# Patient Record
Sex: FEMALE | Race: WHITE
[De-identification: names, ages, dates, MRNs, and addresses within clinical notes are randomized per-mention and may not be internally consistent; named-entity substitution may affect disease eponyms.]

---

## 2019-01-05 ENCOUNTER — HOSPITAL ENCOUNTER (INPATIENT)
Dept: HOSPITAL 91 - OBT | Age: 29
LOS: 3 days | Discharge: HOME | End: 2019-01-08
Payer: COMMERCIAL

## 2019-01-05 ENCOUNTER — HOSPITAL ENCOUNTER (INPATIENT)
Dept: HOSPITAL 10 - L-D | Age: 29
LOS: 3 days | Discharge: HOME | End: 2019-01-08
Attending: OBSTETRICS & GYNECOLOGY | Admitting: OBSTETRICS & GYNECOLOGY
Payer: COMMERCIAL

## 2019-01-05 VITALS — HEART RATE: 71 BPM | DIASTOLIC BLOOD PRESSURE: 51 MMHG | RESPIRATION RATE: 18 BRPM | SYSTOLIC BLOOD PRESSURE: 98 MMHG

## 2019-01-05 VITALS — SYSTOLIC BLOOD PRESSURE: 108 MMHG | DIASTOLIC BLOOD PRESSURE: 57 MMHG | HEART RATE: 88 BPM | RESPIRATION RATE: 20 BRPM

## 2019-01-05 VITALS
HEIGHT: 61 IN | WEIGHT: 185.19 LBS | WEIGHT: 185.19 LBS | BODY MASS INDEX: 34.96 KG/M2 | BODY MASS INDEX: 34.96 KG/M2 | HEIGHT: 61 IN

## 2019-01-05 VITALS — SYSTOLIC BLOOD PRESSURE: 98 MMHG | HEART RATE: 85 BPM | DIASTOLIC BLOOD PRESSURE: 54 MMHG | RESPIRATION RATE: 18 BRPM

## 2019-01-05 DIAGNOSIS — Z23: ICD-10-CM

## 2019-01-05 DIAGNOSIS — D62: ICD-10-CM

## 2019-01-05 DIAGNOSIS — Z3A.38: ICD-10-CM

## 2019-01-05 DIAGNOSIS — G89.18: ICD-10-CM

## 2019-01-05 DIAGNOSIS — O34.219: ICD-10-CM

## 2019-01-05 LAB
ADD MAN DIFF?: NO
BASOPHIL #: 0 10^3/UL (ref 0–0.1)
BASOPHILS %: 0.3 % (ref 0–2)
EOSINOPHILS #: 0.3 10^3/UL (ref 0–0.5)
EOSINOPHILS %: 2 % (ref 0–7)
HEMATOCRIT: 33.8 % (ref 37–47)
HEMOGLOBIN: 11.2 G/DL (ref 12–16)
HEPATITIS B SURFACE ANTIGEN: NEGATIVE
IMMATURE GRANS #M: 0.08 10^3/UL (ref 0–0.03)
IMMATURE GRANS % (M): 0.6 % (ref 0–0.43)
INR: 0.98
LYMPHOCYTES #: 2.9 10^3/UL (ref 0.8–2.9)
LYMPHOCYTES %: 22.1 % (ref 15–51)
MEAN CORPUSCULAR HEMOGLOBIN: 29.5 PG (ref 29–33)
MEAN CORPUSCULAR HGB CONC: 33.1 G/DL (ref 32–37)
MEAN CORPUSCULAR VOLUME: 88.9 FL (ref 82–101)
MEAN PLATELET VOLUME: 9.7 FL (ref 7.4–10.4)
MONOCYTE #: 0.8 10^3/UL (ref 0.3–0.9)
MONOCYTES %: 6.2 % (ref 0–11)
NEUTROPHIL #: 9.1 10^3/UL (ref 1.6–7.5)
NEUTROPHILS %: 68.8 % (ref 39–77)
NUCLEATED RED BLOOD CELLS #: 0 10^3/UL (ref 0–0)
NUCLEATED RED BLOOD CELLS%: 0 /100WBC (ref 0–0)
PARTIAL THROMBOPLASTIN TIME: 28.3 SEC (ref 23–35)
PLATELET COUNT: 272 10^3/UL (ref 140–415)
PROTIME: 13.1 SEC (ref 11.9–14.9)
PT RATIO: 1
RAPID PLASMA REAGIN: NONREACTIVE
RED BLOOD COUNT: 3.8 10^6/UL (ref 4.2–5.4)
RED CELL DISTRIBUTION WIDTH: 13.5 % (ref 11.5–14.5)
WHITE BLOOD COUNT: 13.2 10^3/UL (ref 4.8–10.8)

## 2019-01-05 PROCEDURE — 86900 BLOOD TYPING SEROLOGIC ABO: CPT

## 2019-01-05 PROCEDURE — 86592 SYPHILIS TEST NON-TREP QUAL: CPT

## 2019-01-05 PROCEDURE — 85730 THROMBOPLASTIN TIME PARTIAL: CPT

## 2019-01-05 PROCEDURE — 85610 PROTHROMBIN TIME: CPT

## 2019-01-05 PROCEDURE — G0463 HOSPITAL OUTPT CLINIC VISIT: HCPCS

## 2019-01-05 PROCEDURE — 85025 COMPLETE CBC W/AUTO DIFF WBC: CPT

## 2019-01-05 PROCEDURE — 86901 BLOOD TYPING SEROLOGIC RH(D): CPT

## 2019-01-05 PROCEDURE — 86850 RBC ANTIBODY SCREEN: CPT

## 2019-01-05 PROCEDURE — 90715 TDAP VACCINE 7 YRS/> IM: CPT

## 2019-01-05 PROCEDURE — 87340 HEPATITIS B SURFACE AG IA: CPT

## 2019-01-05 RX ADMIN — Medication 1 MLS/HR: at 13:00

## 2019-01-05 RX ADMIN — AMPICILLIN 1 MLS/HR: 2 INJECTION, POWDER, FOR SOLUTION INTRAVENOUS at 10:03

## 2019-01-05 RX ADMIN — DOCUSATE SODIUM AND SENNOSIDES 1 TAB: 8.6; 5 TABLET, FILM COATED ORAL at 21:29

## 2019-01-05 RX ADMIN — Medication SCH MLS/HR: at 14:31

## 2019-01-05 RX ADMIN — KETOROLAC TROMETHAMINE 1 MG: 30 INJECTION, SOLUTION INTRAMUSCULAR at 23:30

## 2019-01-05 RX ADMIN — PYRIDOXINE HYDROCHLORIDE 1 MLS/HR: 100 INJECTION, SOLUTION INTRAMUSCULAR; INTRAVENOUS at 10:45

## 2019-01-05 RX ADMIN — PYRIDOXINE HYDROCHLORIDE 1 MLS/HR: 100 INJECTION, SOLUTION INTRAMUSCULAR; INTRAVENOUS at 23:45

## 2019-01-05 RX ADMIN — PROMETHAZINE HYDROCHLORIDE 1 MG: 25 TABLET ORAL at 14:27

## 2019-01-05 RX ADMIN — CEFAZOLIN SODIUM 1 MLS/HR: 2 SOLUTION INTRAVENOUS at 11:33

## 2019-01-05 RX ADMIN — DOCUSATE SODIUM AND SENNOSIDES SCH TAB: 8.6; 5 TABLET, FILM COATED ORAL at 21:29

## 2019-01-05 RX ADMIN — PYRIDOXINE HYDROCHLORIDE 1 MLS/HR: 100 INJECTION, SOLUTION INTRAMUSCULAR; INTRAVENOUS at 10:03

## 2019-01-05 RX ADMIN — Medication 1 MLS/HR: at 14:31

## 2019-01-05 RX ADMIN — Medication 1 APPLIC: at 23:50

## 2019-01-05 RX ADMIN — Medication SCH MLS/HR: at 13:00

## 2019-01-05 RX ADMIN — CEFAZOLIN SODIUM 1 MLS/HR: 2 SOLUTION INTRAVENOUS at 19:49

## 2019-01-05 RX ADMIN — ONDANSETRON HYDROCHLORIDE 1 MG: 2 INJECTION, SOLUTION INTRAMUSCULAR; INTRAVENOUS at 12:56

## 2019-01-05 RX ADMIN — PYRIDOXINE HYDROCHLORIDE SCH MLS/HR: 100 INJECTION, SOLUTION INTRAMUSCULAR; INTRAVENOUS at 10:03

## 2019-01-05 RX ADMIN — PYRIDOXINE HYDROCHLORIDE SCH MLS/HR: 100 INJECTION, SOLUTION INTRAMUSCULAR; INTRAVENOUS at 10:45

## 2019-01-05 RX ADMIN — KETOROLAC TROMETHAMINE SCH MG: 30 INJECTION, SOLUTION INTRAMUSCULAR at 23:30

## 2019-01-05 NOTE — TRIAGE
===================================

OB Triage

===================================

Datetime Report Generated by CPN: 01/05/2019 09:53

   

   

===========================

Datetime: 01/05/2019 09:39

===========================

   

 Assessment Type:  Admission Assessment

 Vaginal Bleeding:  None

   

===================================

Maternal Assessment

===================================

   

 Level of Consciousness:  Fully Conscious

 DTR's/Clonus:  DTRs 2+; No Clonus

 Headache:  Denies

 Blurred Vision:  No

 Respiratory Effort:  Unlabored; Regular Rhythm; Equal Expansion

 Breath Sounds, Left:  Clear and Equal

 Breath Sounds, Right:  Clear and Equal

 Nausea/Vomiting:  Denies

 RUQ Epigastric Pain:  Denies

 Lower Extremities Edema:  None

     Degree:  None

 Upper Extremities Edema:  None

     Degree:  None

 Facial Edema:  None

   

===================================

Fall Risk Assessment

===================================

   

 History of Falling:  (0) No

 Secondary Diagnosis:  (0) No

 Ambulatory Aid:  (0) Bedrest/Nurse Assist

 IV Therapy:  (0) No

 Gait:  (0) Normal/Bedrest/Immobile

 Mental Status:  (0) Oriented to Own Ability

 Fall Score:  0

 Fall Risk Score Definition:  No Risk: No action required

   

===================================

Labor Evaluation

===================================

   

 Frequency:  2-5

 Duration (sec)2399:  70

 Quality:  Moderate

 Pattern:  Normal: <= 5 Contractions in 10 Minutes

 Resting Tone [place]:  Relaxed

   

===================================

Fetal Heart Rate

===================================

   

 FHR Baseline Rate:  140

 Variability:  Moderate 6-25 bpm

 Accelerations:  15X15

 Decelerations:  None

 Category:  Category I

   

===================================

Pain Assessment

===================================

   

 Pain Scale:  10

 Pain Presence:  Intermittent

 Pain Type:  Contraction

 Pain Location:  Abdomen

 Pain Goal:  2

   

===================================

Vaginal Exam

===================================

   

 Dilatation (cms):  2.0

 Effacement (%):  80

 Station:  -3

 Membrane Status:  Intact

   

===========================

Datetime: 01/05/2019 08:47

===========================

   

 Time of Arrival:  01/05/2019 08:20

 EGA:  38.3

 Arrived By:  Ambulatory

 Arrived From:  Home

 Chief Complaint:  UCS SINCE 0600

 Fetal Movement:  Present

 Contractions:  Regular

 Time Contractions Began:  01/05/2019 06:00

 Contractions:  Q5 AT HOME

 Patient Complaints:  Contractions

 Time Provider Notified:  01/05/2019 08:45

 Provider Notified:  DR ABUSELME

 Initial Plan:  EF[facility],CALL DR ABUSELEME

   

===========================

Datetime: 01/05/2019 08:46

===========================

   

   

===================================

Maternal Assessment

===================================

   

 Level of Consciousness:  Fully Conscious

 DTR's/Clonus:  DTRs 2+; No Clonus

 Headache:  Denies

 Blurred Vision:  No

 Respiratory Effort:  Unlabored; Regular Rhythm; Equal Expansion

 Breath Sounds, Left:  Clear and Equal

 Breath Sounds, Right:  Clear and Equal

 Nausea/Vomiting:  Denies

 RUQ Epigastric Pain:  Denies

 Facial Edema:  None

 Temperature Route:  Axillary

   

===================================

Fall Risk Assessment

===================================

   

 History of Falling:  (0) No

 Secondary Diagnosis:  (0) No

 Ambulatory Aid:  (0) Bedrest/Nurse Assist

 IV Therapy:  (0) No

 Gait:  (0) Normal/Bedrest/Immobile

 Mental Status:  (0) Oriented to Own Ability

 Fall Score:  0

 Fall Risk Score Definition:  No Risk: No action required

   

===========================

Datetime: 01/05/2019 08:42

===========================

   

   

===================================

Maternal Assessment

===================================

   

 Level of Consciousness:  Fully Conscious

 DTR's/Clonus:  DTRs 2+

 Headache:  Denies

 Blurred Vision:  No

 Nausea/Vomiting:  Denies

 RUQ Epigastric Pain:  Denies

 Facial Edema:  None

   

===================================

Labor Evaluation

===================================

   

 Frequency:  4-6

 Monitor Mode:  External

 Duration (sec)2399:  70-80

 Quality:  Moderate

 Pattern:  Normal: <= 5 Contractions in 10 Minutes

 Resting Tone [place]:  Relaxed

   

===================================

Fetal Heart Rate

===================================

   

 FHR Baseline Rate:  140

 Monitor Mode:  External US

 FHR Baseline Changes:  No Baseline Change

 Variability:  Moderate 6-25 bpm

 Accelerations:  15X15

 Decelerations:  None

 Category:  Category I

   

===================================

Pain Assessment

===================================

   

 Pain Scale:  10

 Pain Presence:  Intermittent

 Pain Type:  Contraction

 Pain Location:  Abdomen

 Pain Goal:  2

   

===================================

Vaginal Exam

===================================

   

 Dilatation (cms):  2.0

 Effacement (%):  80

 Station:  -3

 Exam By:  MATHEW MARRERO

 Membrane Status:  Intact

 Vaginal Bleeding:  None

 Cervix, Consistency:  Moderate

 Cervix, Position:  Midposition

 Fetal Presentation 'A':  Cephalic

## 2019-01-05 NOTE — PREOPHP
DATE OF ADMISSION: 2019

 

HISTORY OF PRESENT ILLNESS:  This is a 28-year-old female,  3, para 2 with two previous C-sect
ions.  This patient had 2 previous  sections, one in , and another one in .  Her due 
date is 2019.  She had been scheduled for this coming Wednesday for a repeat  section. 
 This patient has seen me today in her prenatal care since early pregnancy and she had no complicatio
ns during her pregnancy, except that she was GBS positive.  The patient did not do the second trimest
er screening either and she had no other complications.  The patient had come in today with contracti
ons in labor and she is 2 cm dilated, 100% effaced, -2 station, membranes intact and contractions monico
ry 7 minutes, mild to moderate, and for this reason, a repeat  section will be performed.  Th
is patient was also strongly advised for a tubal ligation, which she declined because of her age, monico
n though she is having a third  section and other long-term birth control were discussed and 
will be done after.

 

PAST MEDICAL HISTORY:  Consistent with her two previous C-sections.  She had an appendectomy in .
 

 

ALLERGIES:  NO HISTORY OF ALLERGIES.

 

SOCIAL HISTORY:  No drug addictions.  No smoking or drinking history. 

 

FAMILY HISTORY:  On her uncle's side, there is diabetes and hypertension on her father.  Otherwise, s
he had healthy family history as well.

## 2019-01-05 NOTE — SIPON
Date/Time of Note


Date/Time of Note


DATE: 19 


TIME: 11:15





Operative Report


Preoperative Diagnosis


Term pregnancy in active labor


Previous  section x2


Repeat 


Postoperative Diagnosis


Same


Operation/Procedure Performed


Repeat low segment transverse  section


Surgeon


see signature line


First assist


DR Bonds


Anesthesia:  spinal


Estimated blood loss:  other


Transfusion Required


   none


Specimen


Placenta


Grafts/Implants


none


Complications


none











MATTHEW LIN MD            2019 11:17

## 2019-01-05 NOTE — PREAC
Date/Time of Note


Date/Time of Note


DATE: 19 


TIME: 11:27





Anesthesia Eval and Record


Evaluation


Time Pre-Procedure Interview


DATE: 19 


TIME: 10:18


Age


28


Sex


female


NPO:  8 hrs


Preoperative diagnosis


iup @ 38 wks., labor, prev. c/s


Planned procedure


repeat c/s





Past Medical History


Past Medical History:  Includes


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


GI:  Other (s/p appy.)


Pregnancy:  : (3), Para: (2), Gestational age: (38.5 wks.)





Surgery & Anesthesia Issues


No known issue





Meds


Anticoagulation:  No


Beta Blocker within 24 hr:  No


Reason Beta Blocker not given:  Pt. not on B-Blocker


Reported Medications


Pnv95/Ferrous Fumarate/FA (Prenatal Vitamins Tablet) 1 Each Tablet, 1 EACH PO, 


TAB


   19





Current Medications


Lactated Ringer's 1,000 ml @  125 mls/hr Q8H IV ;  Start 19 at 11:10;  Stop 


19 at 15:09


Cefazolin Sodium/ Dextrose 50 ml @  100 mls/hr Q8H IVPB ;  Start 19 at 


11:30;  Stop 19 at 03:59


Oxytocin/Lactated Ringer's 500 ml @  50 mls/hr Q10H IV ;  Start 19 at 11:10;


 Stop 19 at 21:09


Methylergonovine Maleate (Methergine) 0.2 mg Q6H  PRN PO VAGINAL BLEEDING;  


Start 19 at 11:30


Acetaminophen/ Hydrocodone Bitart (Norco (5/325)) 1 tab Q4H  PRN PO PAIN LEVEL 


4-6;  Start 19 at 11:30


Acetaminophen/ Hydrocodone Bitart (Norco (5/325)) 2 tab Q4H  PRN PO PAIN LEVEL 


7-10;  Start 19 at 11:30


Ibuprofen (Motrin) 800 mg Q8 PO ;  Start 19 at 14:00;  Status Future Hold


Simethicone (Mylicon) 160 mg Q8H  PRN PO DISTENSION/GAS/BLOATING;  Start 19 


at 11:30


Senna/Docusate Sodium (Senokot-S) 1 tab BID PO ;  Start 19 at 21:00


Sodium Biphosphate/ Sodium Phosphate (Fleet Enema) 133 ml DAILY  PRN VT CONSTI


PATION;  Start 19 at 11:30


Lanolin (Lanolin Hpa) 1 applic BEDSIDE MEDICATION  PRN TOP BEDSIDE FOR BRODY TO 


NIPPLES;  Start 19 at 11:30


Diphtheria/ Tetanus/Acell Pertussis (Adacel) 0.5 ml ONCE ONCE IM* ;  Start 


19 at 09:00;  Stop 19 at 09:01


Measles/Mumps/ Rubella Vaccine Live (Mmr Ii Vaccine) 0.5 ml ONCE ONCE SC* ;  


Start 19 at 09:00;  Stop 19 at 09:01


Oxytocin/Lactated Ringer's 500 ml @ 0 mls/hr ONCE PRN IV VAGINAL BLEEDING;  


Start 19 at 11:30


Methylergonovine Maleate (Methergine) 0.2 mg ONCE PRN IM VAGINAL BLEEDING;  


Start 19 at 11:30


Carboprost Tromethamine (Hemabate) 250 mcg ONCE PRN IM VAGINAL BLEEDING;  Start 


19 at 11:30


Misoprostol (Cytotec) 1,000 mcg ONCE PRN VT VAGINAL BLEEDING;  Start 19 at 


11:30


Ketorolac Tromethamine (Toradol) 30 mg Q6H IV ;  Start 19 at 11:30;  Stop 


19 at 17:31


Meds reviewed:  Yes





Allergies


Coded Allergies:  


     No Known Allergy (Unverified , 19)


Allergies Reviewed:  Yes





Labs/Studies


Labs Reviewed:  Reviewed by anesthesiologist


Result Diagram:  


19 0904





Laboratory Tests


19 09:04











Blood Bank


                   Test
                         19
09:04


                   Antibody Screen               NEGATIVE


                   Blood Type                    O POSITIVE


                   Rh Immune Globulin Candidate  NO





Pregnancy test:  Positive


Studies:  ECG (n/a), CXR (n/a)





Pre-procedure Exam


Last vitals





Vital Signs


  Date      Temp  Pulse  Resp  B/P (MAP)   Pulse Ox  O2          O2 Flow    FiO2


Time                                                 Delivery    Rate


    19  97.8     88    20      108/57            Room Air


     08:42                           (74)





Airway:  Adequate mouth opening, Adequate thyromental dist


Mallampati:  Mallampati II


Teeth:  Normal


Lung:  Normal


Heart:  Normal





ASA Physical Status


ASA physical status:  2


Emergency:  E





Planned Anesthetic


General/MAC:  TIVA (post baby)


Neuraxial:  Spinal





Planned Pain Management


Sub-arachniod narcotics





Pre-operative Attestations


Prior to commencing anesthesia and surgery, the patient was re-evaluated, there 


was verification of:


*The patient's identity


*The results of appropriate recent lab work and preoperative vital signs


*The above evaluation not changing prior to induction


*Anesthetic plan, risk benefits, alternative and complications discussed with 


patient/family; questions answered; patient/family understands, accepts and 


wishes to proceed.


 used











DRISS BORDEN MD            2019 11:32

## 2019-01-05 NOTE — OPPN
Date/Time of Note


Date/Time of Note


DATE: 1/6/19 


TIME: 00:30





Anesthesia Follow up


Anesthesia Follow up


Last documented vital signs





Vital Signs


  Date      Temp  Pulse  Resp  B/P (MAP)   Pulse Ox  O2          O2 Flow    FiO2


Time                                                 Delivery    Rate


    1/5/19  97.8     88    20      108/57            Room Air


     08:42                           (74)





Respiratory function:  WNL


Cardiovascular function:  WNL


Comments


S: Pt. pod #1.  min. bt pain.  min need for bt pain meds ie. nsaids/opiates.  


min. n/v  O: VSS Afeb.  A: min. bt pain sec. to IT MSO4.  P: No complications.











DRISS BORDEN MD            Jan 5, 2019 12:01

## 2019-01-05 NOTE — PAC
Date/Time of Note


Date/Time of Note


DATE: 1/5/19 


TIME: 23:30





Post-Anesthesia Notes


Post-Anesthesia Note


Last documented vital signs





Vital Signs


  Date      Temp  Pulse  Resp  B/P (MAP)   Pulse Ox  O2          O2 Flow    FiO2


Time                                                 Delivery    Rate


    1/5/19  97.8     88    20      108/57            Room Air


     08:42                           (74)





Activity:  WNL


Respiratory function:  WNL


Cardiovascular function:  WNL


Mental status:  Baseline


Pain reasonably controlled:  Yes


Hydration appropriate:  Yes


Nausea/Vomiting absent:  Yes











DRISS BORDEN MD            Jan 5, 2019 11:34

## 2019-01-06 VITALS — RESPIRATION RATE: 19 BRPM | DIASTOLIC BLOOD PRESSURE: 53 MMHG | HEART RATE: 85 BPM | SYSTOLIC BLOOD PRESSURE: 91 MMHG

## 2019-01-06 VITALS — DIASTOLIC BLOOD PRESSURE: 53 MMHG | SYSTOLIC BLOOD PRESSURE: 97 MMHG | RESPIRATION RATE: 18 BRPM | HEART RATE: 77 BPM

## 2019-01-06 VITALS — DIASTOLIC BLOOD PRESSURE: 51 MMHG | RESPIRATION RATE: 18 BRPM | HEART RATE: 82 BPM | SYSTOLIC BLOOD PRESSURE: 99 MMHG

## 2019-01-06 VITALS — RESPIRATION RATE: 16 BRPM | DIASTOLIC BLOOD PRESSURE: 55 MMHG | HEART RATE: 85 BPM | SYSTOLIC BLOOD PRESSURE: 96 MMHG

## 2019-01-06 VITALS — DIASTOLIC BLOOD PRESSURE: 46 MMHG | SYSTOLIC BLOOD PRESSURE: 89 MMHG | HEART RATE: 72 BPM | RESPIRATION RATE: 18 BRPM

## 2019-01-06 LAB
ADD MAN DIFF?: NO
BASOPHIL #: 0 10^3/UL (ref 0–0.1)
BASOPHILS %: 0.2 % (ref 0–2)
EOSINOPHILS #: 0.2 10^3/UL (ref 0–0.5)
EOSINOPHILS %: 1.8 % (ref 0–7)
HEMATOCRIT: 30.8 % (ref 37–47)
HEMOGLOBIN: 10.1 G/DL (ref 12–16)
IMMATURE GRANS #M: 0.06 10^3/UL (ref 0–0.03)
IMMATURE GRANS % (M): 0.5 % (ref 0–0.43)
LYMPHOCYTES #: 2.9 10^3/UL (ref 0.8–2.9)
LYMPHOCYTES %: 22.5 % (ref 15–51)
MEAN CORPUSCULAR HEMOGLOBIN: 29.6 PG (ref 29–33)
MEAN CORPUSCULAR HGB CONC: 32.8 G/DL (ref 32–37)
MEAN CORPUSCULAR VOLUME: 90.3 FL (ref 82–101)
MEAN PLATELET VOLUME: 10 FL (ref 7.4–10.4)
MONOCYTE #: 1 10^3/UL (ref 0.3–0.9)
MONOCYTES %: 8.1 % (ref 0–11)
NEUTROPHIL #: 8.6 10^3/UL (ref 1.6–7.5)
NEUTROPHILS %: 66.9 % (ref 39–77)
NUCLEATED RED BLOOD CELLS #: 0 10^3/UL (ref 0–0)
NUCLEATED RED BLOOD CELLS%: 0 /100WBC (ref 0–0)
PLATELET COUNT: 265 10^3/UL (ref 140–415)
RED BLOOD COUNT: 3.41 10^6/UL (ref 4.2–5.4)
RED CELL DISTRIBUTION WIDTH: 13.7 % (ref 11.5–14.5)
WHITE BLOOD COUNT: 12.9 10^3/UL (ref 4.8–10.8)

## 2019-01-06 RX ADMIN — KETOROLAC TROMETHAMINE 1 MG: 30 INJECTION, SOLUTION INTRAMUSCULAR at 11:30

## 2019-01-06 RX ADMIN — BISACODYL 1 MG: 5 TABLET, COATED ORAL at 17:45

## 2019-01-06 RX ADMIN — PYRIDOXINE HYDROCHLORIDE 1 MLS/HR: 100 INJECTION, SOLUTION INTRAMUSCULAR; INTRAVENOUS at 09:20

## 2019-01-06 RX ADMIN — KETOROLAC TROMETHAMINE SCH MG: 30 INJECTION, SOLUTION INTRAMUSCULAR at 06:05

## 2019-01-06 RX ADMIN — IBUPROFEN SCH MG: 800 TABLET ORAL at 22:41

## 2019-01-06 RX ADMIN — DOCUSATE SODIUM AND SENNOSIDES 1 TAB: 8.6; 5 TABLET, FILM COATED ORAL at 21:14

## 2019-01-06 RX ADMIN — HYDROCODONE BITARTRATE AND ACETAMINOPHEN 1 TAB: 5; 325 TABLET ORAL at 11:54

## 2019-01-06 RX ADMIN — IBUPROFEN 1 MG: 800 TABLET, FILM COATED ORAL at 22:41

## 2019-01-06 RX ADMIN — DOCUSATE SODIUM AND SENNOSIDES SCH TAB: 8.6; 5 TABLET, FILM COATED ORAL at 09:18

## 2019-01-06 RX ADMIN — KETOROLAC TROMETHAMINE 1 MG: 30 INJECTION, SOLUTION INTRAMUSCULAR at 19:01

## 2019-01-06 RX ADMIN — KETOROLAC TROMETHAMINE 1 MG: 30 INJECTION, SOLUTION INTRAMUSCULAR at 14:16

## 2019-01-06 RX ADMIN — KETOROLAC TROMETHAMINE SCH MG: 30 INJECTION, SOLUTION INTRAMUSCULAR at 11:30

## 2019-01-06 RX ADMIN — DOCUSATE SODIUM AND SENNOSIDES SCH TAB: 8.6; 5 TABLET, FILM COATED ORAL at 21:14

## 2019-01-06 RX ADMIN — KETOROLAC TROMETHAMINE SCH MG: 30 INJECTION, SOLUTION INTRAMUSCULAR at 19:01

## 2019-01-06 RX ADMIN — KETOROLAC TROMETHAMINE 1 MG: 30 INJECTION, SOLUTION INTRAMUSCULAR at 06:05

## 2019-01-06 RX ADMIN — DOCUSATE SODIUM AND SENNOSIDES 1 TAB: 8.6; 5 TABLET, FILM COATED ORAL at 09:18

## 2019-01-06 RX ADMIN — KETOROLAC TROMETHAMINE SCH MG: 30 INJECTION, SOLUTION INTRAMUSCULAR at 14:16

## 2019-01-06 RX ADMIN — CEFAZOLIN SODIUM 1 MLS/HR: 2 SOLUTION INTRAVENOUS at 04:01

## 2019-01-07 VITALS — SYSTOLIC BLOOD PRESSURE: 98 MMHG | HEART RATE: 73 BPM | RESPIRATION RATE: 18 BRPM | DIASTOLIC BLOOD PRESSURE: 56 MMHG

## 2019-01-07 VITALS — RESPIRATION RATE: 18 BRPM | HEART RATE: 72 BPM | DIASTOLIC BLOOD PRESSURE: 57 MMHG | SYSTOLIC BLOOD PRESSURE: 103 MMHG

## 2019-01-07 VITALS — DIASTOLIC BLOOD PRESSURE: 45 MMHG | RESPIRATION RATE: 18 BRPM | HEART RATE: 67 BPM | SYSTOLIC BLOOD PRESSURE: 92 MMHG

## 2019-01-07 VITALS — RESPIRATION RATE: 18 BRPM | HEART RATE: 68 BPM | DIASTOLIC BLOOD PRESSURE: 55 MMHG | SYSTOLIC BLOOD PRESSURE: 94 MMHG

## 2019-01-07 RX ADMIN — IBUPROFEN SCH MG: 800 TABLET ORAL at 06:09

## 2019-01-07 RX ADMIN — IBUPROFEN SCH MG: 800 TABLET ORAL at 21:36

## 2019-01-07 RX ADMIN — DOCUSATE SODIUM AND SENNOSIDES 1 TAB: 8.6; 5 TABLET, FILM COATED ORAL at 21:36

## 2019-01-07 RX ADMIN — IBUPROFEN SCH MG: 800 TABLET ORAL at 14:11

## 2019-01-07 RX ADMIN — HYDROCODONE BITARTRATE AND ACETAMINOPHEN PRN TAB: 5; 325 TABLET ORAL at 14:14

## 2019-01-07 RX ADMIN — DOCUSATE SODIUM AND SENNOSIDES 1 TAB: 8.6; 5 TABLET, FILM COATED ORAL at 09:40

## 2019-01-07 RX ADMIN — IBUPROFEN 1 MG: 800 TABLET, FILM COATED ORAL at 21:36

## 2019-01-07 RX ADMIN — IBUPROFEN 1 MG: 800 TABLET, FILM COATED ORAL at 06:09

## 2019-01-07 RX ADMIN — DOCUSATE SODIUM AND SENNOSIDES SCH TAB: 8.6; 5 TABLET, FILM COATED ORAL at 09:40

## 2019-01-07 RX ADMIN — HYDROCODONE BITARTRATE AND ACETAMINOPHEN 1 TAB: 5; 325 TABLET ORAL at 14:14

## 2019-01-07 RX ADMIN — IBUPROFEN 1 MG: 800 TABLET, FILM COATED ORAL at 14:11

## 2019-01-07 RX ADMIN — DOCUSATE SODIUM AND SENNOSIDES SCH TAB: 8.6; 5 TABLET, FILM COATED ORAL at 21:36

## 2019-01-08 VITALS — RESPIRATION RATE: 18 BRPM | HEART RATE: 75 BPM | SYSTOLIC BLOOD PRESSURE: 109 MMHG | DIASTOLIC BLOOD PRESSURE: 58 MMHG

## 2019-01-08 VITALS — SYSTOLIC BLOOD PRESSURE: 102 MMHG | RESPIRATION RATE: 18 BRPM | DIASTOLIC BLOOD PRESSURE: 57 MMHG | HEART RATE: 70 BPM

## 2019-01-08 RX ADMIN — CLOSTRIDIUM TETANI TOXOID ANTIGEN (FORMALDEHYDE INACTIVATED), CORYNEBACTERIUM DIPHTHERIAE TOXOID ANTIGEN (FORMALDEHYDE INACTIVATED), BORDETELLA PERTUSSIS TOXOID ANTIGEN (GLUTARALDEHYDE INACTIVATED), BORDETELLA PERTUSSIS FILAMENTOUS HEMAGGLUTININ ANTIGEN (FORMALDEHYDE INACTIVATED), BORDETELLA PERTUSSIS PERTACTIN ANTIGEN, AND BORDETELLA PERTUSSIS FIMBRIAE 2/3 ANTIGEN 1 ML: 5; 2; 2.5; 5; 3; 5 INJECTION, SUSPENSION INTRAMUSCULAR at 13:42

## 2019-01-08 RX ADMIN — IBUPROFEN SCH MG: 800 TABLET ORAL at 13:41

## 2019-01-08 RX ADMIN — IBUPROFEN 1 MG: 800 TABLET, FILM COATED ORAL at 05:37

## 2019-01-08 RX ADMIN — DOCUSATE SODIUM AND SENNOSIDES 1 TAB: 8.6; 5 TABLET, FILM COATED ORAL at 09:00

## 2019-01-08 RX ADMIN — DOCUSATE SODIUM AND SENNOSIDES SCH TAB: 8.6; 5 TABLET, FILM COATED ORAL at 09:00

## 2019-01-08 RX ADMIN — IBUPROFEN SCH MG: 800 TABLET ORAL at 05:37

## 2019-01-08 RX ADMIN — IBUPROFEN 1 MG: 800 TABLET, FILM COATED ORAL at 13:41

## 2019-01-08 RX ADMIN — HYDROCODONE BITARTRATE AND ACETAMINOPHEN PRN TAB: 5; 325 TABLET ORAL at 04:04

## 2019-01-08 RX ADMIN — HYDROCODONE BITARTRATE AND ACETAMINOPHEN 1 TAB: 5; 325 TABLET ORAL at 04:04

## 2019-01-08 RX ADMIN — MEASLES, MUMPS, AND RUBELLA VIRUS VACCINE LIVE 1 ML: 1000; 12500; 1000 INJECTION, POWDER, LYOPHILIZED, FOR SUSPENSION SUBCUTANEOUS at 09:00

## 2019-01-08 NOTE — PD.PPDC
OB/GYN Discharge Instruction


Condition


                 [id number]
Patient Condition:  Femsl2u
Good








Diet


                Tiqno7Se
Diet:  Xmyek5y
Resume Regular Diet








Activity/Restrictions


              Hutdc2Ag
Activity:      Vplog9p
Normal Activity


                                        May Shower


              [id number]
Restrictions:  Gcvaa6f
No Exercising


                                        No Lifting


                                        No Driving


                                        No Sexual Activity


                                        Nothing in the Vagina


                                        No [place]


                                        No Tampons, douche








Wound/Drain Care Instructions


 [id number]
Wound/Drain Care Instructions:  Olemq6k
Wash with soap and water


                                            Keep clean and dry








Follow-up


Follow-up with Physician:  1, 2, Week/Weeks





Return to clinic for


      Ljpvq2Tb
GYN Instructions:       Gabqj2k
Fever greater than 101


                                         Chills


                                         Worsening abdominal pain


                                         Excessive Vaginal Bleeding


                                         More than 2 pads per hour


                                         Unable to tolerate diet


      Cbnqf3Fe
OB Instructions:        Dbknk0b
Breast Tenderness


                                         Depression


                                         Blurried Vision


                                         Headache


      Bztgz6Zz
Surgical Instructions:  Iemtu1s
Incisional Drainage


                                         Incisional Redness














MATTHEW LIN MD            Jan 8, 2019 11:27

## 2019-01-08 NOTE — OPR
DATE OF OPERATION:  

 

 

Operative report for a routine  section on 2019.

 

PREOPERATIVE DIAGNOSIS:  Term pregnancy in active labor, previous  sections x2.

 

POSTOPERATIVE DIAGNOSIS:  Term pregnancy in active labor, previous  sections x2.

 

OPERATION PERFORMED:  Repeat low segment transverse  section.

 

ASSISTANT:   ____ and  ____.

 

ANESTHESIA:  Spinal.

 

PROCEDURE:  The patient was given spinal anesthesia, placed in the supine position and a Smith cathet
er was placed in the bladder.  The abdomen was prepped and draped.  After the infusion of the spinal 
anesthesia and a previous scar was removed.  The transverse incision was made 2 cm over the pubic bon
e of about 10 cm in length and the abdomen was opened in layers without difficulties.  Abdominal cavi
ty was reached.  The John retractor was placed in and the bladder flap was made.  The uterus was op
ened in the midline with a scalpel and the incision was increased laterally on either side for about 
3 inches.  The baby's head was delivered followed by the body.  The cord was clamped and cut.  The ba
by was handed over to the neonatologist team.  The cord blood was obtained and the placenta was remov
ed.  The uterus was contracted and closed in 2 layers using a PDS looped suture imbedding the first l
ine of suture.  Hemostasis was good.  The tubes and ovaries were normal.  The uterus was normal and n
ow the abdominal cavity was cleaned out.  The sponge counts and instrument counts were correct.  The 
peritoneum was closed with a 2-0 Vicryl suture.  The fascia was closed with 0 PDS looped suture, 2-0 
Vicryl for the subcutaneous tissue, 3-0 Monocryl subcuticular to the skin, Steri-Strips and Dermabond
.  The patient tolerated the procedure well and left the OR awake and stable.  Sponge counts and inst
rument counts were correct.  Intravenous antibiotics were given for prophylaxis.  Blood loss was abou
t 600 mL, and the urine was clear at the end of the procedure.

 

 

Dictated By: MATTHEW HOLLEY/KAY

DD:    2019 21:27:08

DT:    2019 23:00:53

Conf#: [id number]

DID#:  [id number]

## 2019-01-08 NOTE — DS
Date/Time of Note


Date/Time of Note


DATE: 19 


TIME: 11:31





Obstetrical Discharge Record


Final Diagnosis


Final Diagnosis:  Term delivered


Other Final Diagnosis


Term pregnancy


Previous  in labor


Repeat 





 Section


 Section:  Repeat





Condition on Discharge


Physical Assessment


Last Vitals:


Patient came in labor with a history of a previous .


She was 38 and 4 weeks pregnancy


Repeat  section was done without complications.


She was slightly anemic postop but asymptomatic.


She was tolerating diet with bowel movement clean incision ambulatory and happy 


to go home on the third day postop.


Pain was controlled with p.o. meds


She was discharged to see me in the office in a week or earlier if she had any 


problems


She was given instructions how to take care of herself on her incision at home


And she was given all the red flags to call me at any time if she has an 


emergency


She is scheduled for 1 week and she was given Tylenol 3 and ibuprofen as needed 


and to continue with her prenatal vitamins and high protein diet


Voiding:  Yes


Bowel Movement:  Yes


Breast:  Soft, non-tender, Filling


Fundus:  Firm


Calf Tenderness:  No


Patient Condition:  Good











MATTHEW LIN MD            2019 11:31

## 2019-01-08 NOTE — PN
Date/Time of Note


Date/Time of Note


DATE: 1/7/19 


TIME: 11:29





OB Subjective


Subjective


Subjective


Doing well, afebrile


Slightly anemic with no symptoms


Stable


Incision healing good


Passing gases no BM yet


Encouraged ambulation





OB Objective


HEENT:  WNL


Heart:  Rhythm Normal


Lungs:  Clear, Equal


Abdomen:  WNL


Extremities:  Normal


Reflexes:  Normal











MATTHEW LIN MD            Jan 8, 2019 11:30